# Patient Record
Sex: FEMALE
[De-identification: names, ages, dates, MRNs, and addresses within clinical notes are randomized per-mention and may not be internally consistent; named-entity substitution may affect disease eponyms.]

---

## 2021-03-19 PROBLEM — Z00.00 ENCOUNTER FOR PREVENTIVE HEALTH EXAMINATION: Status: ACTIVE | Noted: 2021-03-19

## 2021-03-22 ENCOUNTER — APPOINTMENT (OUTPATIENT)
Dept: OTOLARYNGOLOGY | Facility: CLINIC | Age: 56
End: 2021-03-22
Payer: COMMERCIAL

## 2021-03-22 VITALS — HEIGHT: 64 IN | WEIGHT: 107 LBS | TEMPERATURE: 98.2 F | BODY MASS INDEX: 18.27 KG/M2

## 2021-03-22 DIAGNOSIS — Z82.49 FAMILY HISTORY OF ISCHEMIC HEART DISEASE AND OTHER DISEASES OF THE CIRCULATORY SYSTEM: ICD-10-CM

## 2021-03-22 DIAGNOSIS — H90.42 SENSORINEURAL HEARING LOSS, UNILATERAL, LEFT EAR, WITH UNRESTRICTED HEARING ON THE CONTRALATERAL SIDE: ICD-10-CM

## 2021-03-22 DIAGNOSIS — R42 DIZZINESS AND GIDDINESS: ICD-10-CM

## 2021-03-22 PROCEDURE — 92557 COMPREHENSIVE HEARING TEST: CPT

## 2021-03-22 PROCEDURE — 99072 ADDL SUPL MATRL&STAF TM PHE: CPT

## 2021-03-22 PROCEDURE — 92567 TYMPANOMETRY: CPT

## 2021-03-22 PROCEDURE — 99203 OFFICE O/P NEW LOW 30 MIN: CPT

## 2021-03-22 RX ORDER — ZOLPIDEM TARTRATE 5 MG/1
5 TABLET, FILM COATED ORAL
Refills: 0 | Status: ACTIVE | COMMUNITY

## 2021-03-22 RX ORDER — CITALOPRAM HYDROBROMIDE 20 MG/1
20 TABLET, FILM COATED ORAL
Refills: 0 | Status: ACTIVE | COMMUNITY

## 2021-03-22 NOTE — HISTORY OF PRESENT ILLNESS
[de-identified] : NAVI DIAZ is a 55 year patient seen in consultation for vertigo. She was referred by Dr. Rae. About 2 weeks ago, she developed episodes of dizziness. She describes a spinning sensation. The episodes would last several hours. She also felt off balance and lightheaded. In between episodes, she felt normal. She occasionally has dizziness when she rolls over in bed. Her last episode was last Wednesday. She has no otalgia, otorrhea, tinnitus, ear fullness, or change in her hearing. She has a history of recurrent ear infections, prior otologic surgery, ear/head trauma, noise exposure, or family history of ear disease. She did have light sensitivity during one episode. She does not report a history of headaches or migraines. She has not had hormonal changes. She has no  muscle weakness, numbness, visual changes, palpitations, or shortness of breath. She did have a stiff neck but does not have a history of cervical disc disease. She is going to see a cardiologist because there's a family history of heart disease. She has not seen a neurologist.

## 2021-03-22 NOTE — CONSULT LETTER
[Dear  ___] : Dear  [unfilled], [Consult Letter:] : I had the pleasure of evaluating your patient, [unfilled]. [Please see my note below.] : Please see my note below. [Consult Closing:] : Thank you very much for allowing me to participate in the care of this patient.  If you have any questions, please do not hesitate to contact me. [Sincerely,] : Sincerely, [FreeTextEntry3] : Carmen Miller MD\par

## 2021-03-22 NOTE — ASSESSMENT
[FreeTextEntry1] : She had a two-week history of recurrent dizziness and vertigo. Her last episode was 5 days ago. Her ear exam was normal. Artemus-Hallpike testing was negative. Audiogram was essentially normal except for a mild sensorineural hearing loss in the left ear at 500 Hz and a type As tympanogram on the left side. There was no middle ear effusion on exam. We discussed possible etiologies such as inner ear disease due to labyrinthitis or Ménière's disease. Positional vertigo is unlikely as the episodes lasted hours and Artemus-Hallpike testing was negative. We also discussed other possible etiologies such as cervical disc disease, atypical migraines, cardiac disease, and other neurological problems.\par \par PLAN\par \par -findings and management options discussed in detail with the patient. \par -good aural hygiene\par -avoid using cotton swabs in the ears\par -noise precautions\par -annual audiogram\par -low salt diet and avoidance of caffeine, alcohol and nicotine\par -meclizine prn severe dizziness/vertigo\par - consider ABR, VNG, Ecochg to evaluate for peripheral vestibulopathy if she has recurrent dizziness\par -neurology evaluation to rule out neurological causes. Cardiac evaluation is pending. \par -consider MRI with/without contrast to rule out retrocochlear pathology if she has recurrent dizziness\par -vestibular therapy and/or home exercises if she has recurrent dizziness\par -follow up in 6 months to recheck her hearing if she does not have any further episodes. call and return earlier if any concerns or recurrent dizziness.\par

## 2025-07-30 ENCOUNTER — NON-APPOINTMENT (OUTPATIENT)
Age: 60
End: 2025-07-30

## 2025-07-30 ENCOUNTER — APPOINTMENT (OUTPATIENT)
Dept: GYNECOLOGIC ONCOLOGY | Facility: CLINIC | Age: 60
End: 2025-07-30
Payer: COMMERCIAL

## 2025-07-30 VITALS
TEMPERATURE: 97.3 F | OXYGEN SATURATION: 99 % | DIASTOLIC BLOOD PRESSURE: 94 MMHG | WEIGHT: 103.62 LBS | SYSTOLIC BLOOD PRESSURE: 148 MMHG | HEART RATE: 61 BPM | HEIGHT: 64 IN | BODY MASS INDEX: 17.69 KG/M2

## 2025-07-30 DIAGNOSIS — N83.8 OTHER NONINFLAMMATORY DISORDERS OF OVARY, FALLOPIAN TUBE AND BROAD LIGAMENT: ICD-10-CM

## 2025-07-30 DIAGNOSIS — D25.9 LEIOMYOMA OF UTERUS, UNSPECIFIED: ICD-10-CM

## 2025-07-30 PROCEDURE — 99205 OFFICE O/P NEW HI 60 MIN: CPT

## 2025-07-31 ENCOUNTER — NON-APPOINTMENT (OUTPATIENT)
Age: 60
End: 2025-07-31

## 2025-08-13 ENCOUNTER — TRANSCRIPTION ENCOUNTER (OUTPATIENT)
Age: 60
End: 2025-08-13

## 2025-08-25 ENCOUNTER — APPOINTMENT (OUTPATIENT)
Dept: GYNECOLOGIC ONCOLOGY | Facility: CLINIC | Age: 60
End: 2025-08-25

## 2025-09-08 ENCOUNTER — NON-APPOINTMENT (OUTPATIENT)
Age: 60
End: 2025-09-08

## 2025-09-16 ENCOUNTER — APPOINTMENT (OUTPATIENT)
Dept: GYNECOLOGIC ONCOLOGY | Facility: HOSPITAL | Age: 60
End: 2025-09-16